# Patient Record
Sex: FEMALE | Race: BLACK OR AFRICAN AMERICAN
[De-identification: names, ages, dates, MRNs, and addresses within clinical notes are randomized per-mention and may not be internally consistent; named-entity substitution may affect disease eponyms.]

---

## 2019-03-30 ENCOUNTER — HOSPITAL ENCOUNTER (EMERGENCY)
Dept: HOSPITAL 26 - MED | Age: 39
Discharge: HOME | End: 2019-03-30
Payer: COMMERCIAL

## 2019-03-30 VITALS
SYSTOLIC BLOOD PRESSURE: 129 MMHG | BODY MASS INDEX: 23.95 KG/M2 | HEIGHT: 68 IN | WEIGHT: 158 LBS | DIASTOLIC BLOOD PRESSURE: 88 MMHG

## 2019-03-30 VITALS — DIASTOLIC BLOOD PRESSURE: 76 MMHG | SYSTOLIC BLOOD PRESSURE: 119 MMHG

## 2019-03-30 DIAGNOSIS — M54.5: Primary | ICD-10-CM

## 2019-03-30 DIAGNOSIS — K59.00: ICD-10-CM

## 2019-03-30 PROCEDURE — 96372 THER/PROPH/DIAG INJ SC/IM: CPT

## 2019-03-30 PROCEDURE — 72100 X-RAY EXAM L-S SPINE 2/3 VWS: CPT

## 2019-03-30 PROCEDURE — 81002 URINALYSIS NONAUTO W/O SCOPE: CPT

## 2019-03-30 PROCEDURE — 99283 EMERGENCY DEPT VISIT LOW MDM: CPT

## 2019-03-30 PROCEDURE — 81025 URINE PREGNANCY TEST: CPT

## 2019-03-30 NOTE — NUR
BIB SELF. AAO X 4. PT C/O LOW BACK PAIN 8/10 ACHING, NONRADIATING FOR 6 DAYS. 
DIFFICULTY AMBULATING. TRIED ICE AND MEDICATION AT HOME WITH NO RELIEF. PT IS 
ON BACK BRACE. DENIES TRAUMA OR INJURY. UNABLE TO FULLY BEND. EQUAL BILATERAL 
STRENGTH TO UPPER AND LOWER EXTREMITIES. HOB UP. ON LOW BED POSITION, LOCKED. 
BED SIDE RAILS UP X1. MD ER MADE AWARE OF PT STATUS.

## 2019-03-30 NOTE — NUR
-------------------------------------------------------------------------------

            *** Note undone in EDM - 03/30/19 at 1118 by MED ***             

-------------------------------------------------------------------------------

BIB SELF. AAO X 4. PT C/O LOW BACK PAIN 8/10 ACHING, NONRADIATING FOR 6 DAYS. 
DIFFICULTY AMBULATING. TRIED ICE AND MEDICATION AT HOME WITH NO RELIEF. PT IS 
ON BACK BRACE. UNABLE TO FULLY BEND. EQUAL BILATERAL STRENGTH TO UPPER AND 
LOWER EXTREMITIES. HOB UP. ON LOW BED POSITION, LOCKED. BED SIDE RAILS UP X1. 
MD GENAO MADE AWARE OF PT STATUS.

## 2019-03-30 NOTE — NUR
Patient discharged with v/s stable. Written and verbal after care instructions 
given and explained. Patient alert, oriented and verbalized understanding of 
instructions. Ambulatory with steady gait. All questions addressed prior to 
discharge. ID band removed. Patient advised to follow up with PMD. Rx of 
Colace Valtaren given. Patient educated on indication of medication including 
possible reaction and side effects. Opportunity to ask questions provided and 
answered.

## 2021-02-13 ENCOUNTER — HOSPITAL ENCOUNTER (EMERGENCY)
Dept: HOSPITAL 26 - MED | Age: 41
Discharge: HOME | End: 2021-02-13
Payer: COMMERCIAL

## 2021-02-13 VITALS — DIASTOLIC BLOOD PRESSURE: 77 MMHG | SYSTOLIC BLOOD PRESSURE: 132 MMHG

## 2021-02-13 VITALS — DIASTOLIC BLOOD PRESSURE: 79 MMHG | SYSTOLIC BLOOD PRESSURE: 140 MMHG

## 2021-02-13 VITALS — WEIGHT: 201 LBS | HEIGHT: 68 IN | BODY MASS INDEX: 30.46 KG/M2

## 2021-02-13 DIAGNOSIS — Z85.3: ICD-10-CM

## 2021-02-13 DIAGNOSIS — G43.909: ICD-10-CM

## 2021-02-13 DIAGNOSIS — D25.9: Primary | ICD-10-CM

## 2021-02-13 LAB
ALBUMIN FLD-MCNC: 3.8 G/DL (ref 3.4–5)
ANION GAP SERPL CALCULATED.3IONS-SCNC: 14 MMOL/L (ref 8–16)
AST SERPL-CCNC: 38 U/L (ref 15–37)
BASOPHILS # BLD AUTO: 0 K/UL (ref 0–0.22)
BASOPHILS NFR BLD AUTO: 0.6 % (ref 0–2)
BILIRUB SERPL-MCNC: 0.3 MG/DL (ref 0–1)
BUN SERPL-MCNC: 8 MG/DL (ref 7–18)
CHLORIDE SERPL-SCNC: 105 MMOL/L (ref 98–107)
CO2 SERPL-SCNC: 25.7 MMOL/L (ref 21–32)
CREAT SERPL-MCNC: 0.9 MG/DL (ref 0.6–1.3)
EOSINOPHIL # BLD AUTO: 0.2 K/UL (ref 0–0.4)
EOSINOPHIL NFR BLD AUTO: 2.9 % (ref 0–4)
ERYTHROCYTE [DISTWIDTH] IN BLOOD BY AUTOMATED COUNT: 13.6 % (ref 11.6–13.7)
GFR SERPL CREATININE-BSD FRML MDRD: 89 ML/MIN (ref 90–?)
GLUCOSE SERPL-MCNC: 84 MG/DL (ref 74–106)
HCT VFR BLD AUTO: 38.9 % (ref 36–48)
HGB BLD-MCNC: 12.7 G/DL (ref 12–16)
LIPASE SERPL-CCNC: 321 U/L (ref 73–393)
LYMPHOCYTES # BLD AUTO: 2.2 K/UL (ref 2.5–16.5)
LYMPHOCYTES NFR BLD AUTO: 33.2 % (ref 20.5–51.1)
MCH RBC QN AUTO: 29 PG (ref 27–31)
MCHC RBC AUTO-ENTMCNC: 33 G/DL (ref 33–37)
MCV RBC AUTO: 88.9 FL (ref 80–94)
MONOCYTES # BLD AUTO: 0.4 K/UL (ref 0.8–1)
MONOCYTES NFR BLD AUTO: 6.5 % (ref 1.7–9.3)
NEUTROPHILS # BLD AUTO: 3.8 K/UL (ref 1.8–7.7)
NEUTROPHILS NFR BLD AUTO: 56.8 % (ref 42.2–75.2)
PLATELET # BLD AUTO: 263 K/UL (ref 140–450)
POTASSIUM SERPL-SCNC: 3.7 MMOL/L (ref 3.5–5.1)
RBC # BLD AUTO: 4.37 MIL/UL (ref 4.2–5.4)
SODIUM SERPL-SCNC: 141 MMOL/L (ref 136–145)
WBC # BLD AUTO: 6.6 K/UL (ref 4.8–10.8)

## 2021-02-13 PROCEDURE — 96360 HYDRATION IV INFUSION INIT: CPT

## 2021-02-13 PROCEDURE — 80053 COMPREHEN METABOLIC PANEL: CPT

## 2021-02-13 PROCEDURE — 99285 EMERGENCY DEPT VISIT HI MDM: CPT

## 2021-02-13 PROCEDURE — 81025 URINE PREGNANCY TEST: CPT

## 2021-02-13 PROCEDURE — 36415 COLL VENOUS BLD VENIPUNCTURE: CPT

## 2021-02-13 PROCEDURE — 81002 URINALYSIS NONAUTO W/O SCOPE: CPT

## 2021-02-13 PROCEDURE — 85025 COMPLETE CBC W/AUTO DIFF WBC: CPT

## 2021-02-13 PROCEDURE — 74177 CT ABD & PELVIS W/CONTRAST: CPT

## 2021-02-13 PROCEDURE — 96361 HYDRATE IV INFUSION ADD-ON: CPT

## 2021-02-13 PROCEDURE — 83690 ASSAY OF LIPASE: CPT

## 2021-02-13 NOTE — NUR
PT WAS FOUND AWAKE SITTING ON BED. PT STATES NO DISTRESS AND NO DISCOMFORT. BED 
LOCKED IN LOWEST POSITION.

## 2021-02-13 NOTE — NUR
39 Y/O F C/O ABD PAIN THAT STARTED 02/10/21 IN [facility] IS NOW RADIATING TO RLQ AND 
CENTER OF ABD. 2/10 DULL PAIN, HAS BEEN TAKING SUMATRIPTAN 50MG PM, LAST DOSE 
WAS 02/12/21. NO N&V, LAST BM WAS NORMAL, SOFT AND EASY TO PASS 02/12/21. NO 
BURNING OR PAIN DURING URINATION. URINE WAS COLLECTED. PMH: BREAST CANCER IN 
2017. PT DENIES CONTACT WITH ANYONE COVID POSTIVE AND SOB, COUGH OR CHEST PAIN 
SYMPTOMS.